# Patient Record
Sex: FEMALE | Race: WHITE | NOT HISPANIC OR LATINO | Employment: UNEMPLOYED | URBAN - METROPOLITAN AREA
[De-identification: names, ages, dates, MRNs, and addresses within clinical notes are randomized per-mention and may not be internally consistent; named-entity substitution may affect disease eponyms.]

---

## 2017-05-05 ENCOUNTER — OFFICE VISIT (OUTPATIENT)
Dept: NEUROLOGY | Facility: CLINIC | Age: 57
End: 2017-05-05

## 2017-05-05 VITALS — DIASTOLIC BLOOD PRESSURE: 94 MMHG | HEART RATE: 106 BPM | SYSTOLIC BLOOD PRESSURE: 140 MMHG | RESPIRATION RATE: 18 BRPM

## 2017-05-05 DIAGNOSIS — R15.9 FECAL INCONTINENCE: ICD-10-CM

## 2017-05-05 DIAGNOSIS — M54.2 CERVICALGIA: ICD-10-CM

## 2017-05-05 DIAGNOSIS — R32 URINARY INCONTINENCE, UNSPECIFIED TYPE: ICD-10-CM

## 2017-05-05 DIAGNOSIS — I15.9 SECONDARY HYPERTENSION: ICD-10-CM

## 2017-05-05 RX ORDER — ALBUTEROL SULFATE 90 UG/1
2 AEROSOL, METERED RESPIRATORY (INHALATION) EVERY 4 HOURS PRN
COMMUNITY
Start: 2011-11-07

## 2017-05-05 RX ORDER — DIAZEPAM 5 MG
TABLET ORAL
Qty: 2 TABLET | Refills: 0 | Status: SHIPPED | OUTPATIENT
Start: 2017-05-05 | End: 2022-06-29

## 2017-05-05 RX ORDER — EPINEPHRINE 0.3 MG/.3ML
0.3 INJECTION SUBCUTANEOUS PRN
COMMUNITY
Start: 2011-07-08

## 2017-05-05 RX ORDER — MULTIVIT-MIN/IRON FUM/FOLIC AC 7.5 MG-4
1 TABLET ORAL DAILY
COMMUNITY
Start: 2011-11-01 | End: 2022-06-29

## 2017-05-05 RX ORDER — LEVOTHYROXINE SODIUM 200 UG/1
200 TABLET ORAL
COMMUNITY
Start: 2017-03-16 | End: 2022-06-29

## 2017-05-05 RX ORDER — CLONIDINE HYDROCHLORIDE 0.1 MG/1
0.1 TABLET ORAL 2 TIMES DAILY PRN
COMMUNITY
Start: 2017-03-16 | End: 2022-06-29

## 2017-05-05 ASSESSMENT — PAIN SCALES - GENERAL: PAINLEVEL: EXTREME PAIN (8)

## 2017-05-05 NOTE — LETTER
2017       RE: Jen Perry  [address]  [facility] [place] [facility] [address]     Dear Colleague,    Thank you for referring your patient, Jen Perry, to the [facility] NEUROLOGY CLINIC at [facility]. Please see a copy of my visit note below.    May 5, 2017      Tati Garcia MD   [facility]    [address] [place] [address]      RE: Jen Perry   MRN: [id number]   : 1960      Dear Tati:      I saw Jen Perry on 2017.  She is a 56-year-old female here for evaluation of somewhat unusual spells that have been occurring daily for the last couple of months.      The episodes are rather stereotyped.  She will get a tightening pain in her neck that may spread to both sides of her head and then into her face and chest.  She has a sense like she will need to vomit and move her bowels.  She can be incontinent of urine.  The episodes typically last several minutes.  She tells me she is awake and remembers what is happening, but she has some trouble with her concentration during these episodes.  On occasion she has stuttered and had difficulty speaking.  She can have up to 3 of these a day.  They sometimes awaken her from sleep.  She does not describe any flushing and her sister and mother who were present have not noticed her to be flushed during these episodes.      She does report she had a flurry of seizures 8 years ago that persisted over about 3 months.  She tells me this was related to withdrawing from benzodiazepines.  She also gives a history of migraine in the past but they are rather infrequent now.      She does tell me she went to the emergency room and they did some evaluation from a cardiac standpoint, but I am not certain how extensive that was and I do not have any specific record of that.      She did have a brain MRI scan done on 2017.  There were no  acute intracranial lesions.  There were a few small foci of nonspecific T2 FLAIR signal change in the cerebral white matter and selina consistent with mild chronic small vessel disease.      A head MR angiogram was normal.      An intracranial MR angiogram demonstrated a couple of small outpouchings arising from vessels.  One being the left posterior cerebral artery and the second being the left anterior cerebral artery.  She subsequently had a formal angiogram.  The reading I have indicates it was felt these were most likely infundibulum but she does tell me that she has seen Dr. Gonzalez who thinks they may represent aneurysms and he is going to be following them.  I did advise her that these were incidental findings.      She does have a chronic pain syndrome and had been on methadone but came off it 3 weeks ago and she wonders if coming off the methadone may have precipitated these spells.      Her past medical history is notable for depression, chronic pain, substance abuse, asthma and hypothyroidism.  She has had a cervical fusion, she believes at C3-C4 and C4-C5.      She indicates that she has recently been diagnosed with hypertension.      CURRENT MEDICATIONS:     1.  Albuterol.     2.  Clonidine.     3.  Levothyroxine.     4.  Multivitamin.      ALLERGIES:  She has allergies to shellfish, quetiapine (myoclonus), sumatriptan, cyclobenzaprine, latex, penicillin, prochlorperazine, and neomycin, bacitracin, polymyxin.      There is no family history of neurologic disease that she is aware of.      The patient is disabled.  She sometimes smokes.  She has up to 3 alcoholic drinks a day.      Examination reveals patient is alert and cooperative.  Heart rate 106.  Blood pressure 140/94.  Pupils are equal, round and react well to light.  Extraocular motility is full.  Visual fields are intact.  Fundi are unremarkable.  Cranial nerves II-XII are intact.  Motor, sensory and cerebellar testing are normal.  She does have  a slight postural tremor.  Her gait is normal.  She is generally hyporeflexic.  Plantar responses are flexor.      IMPRESSION:  Unusual spells.        The patient reports the recent development of daily episodes of neck pain, head pain, chest pain, followed by diarrhea, urinary incontinence, and mild confusion.  The etiology of these spells at this juncture is unclear.      PLAN:  I did discuss the possibility of atypical angina with her.  As per our discussion, she is going to be contacting your office to set up a cardiac stress test.      I think seizure is a low probability, but is in the differential and she is going to have a 3-hour EEG done.      It does seem that these episodes initially start with pain in the neck.  She has had previous cervical spinal surgery.  I have asked her to have a cervical MRI scan done.      In view of these paroxysmal episodes I am also going to have her do a 24-hour urine collection for catecholamines, metanephrines and 5-HIAA.      I will be seeing her back to review test results.      Sincerely,        Andres Vyas MD              D: 2017 12:43   T: 2017 14:51   MT: AKA      Name:     ZENON ISAAC   MRN:      [id number]        Account:      [id number]   :      1960           Service Date: 2017      Document: [id number]

## 2017-05-05 NOTE — PROGRESS NOTES
May 5, 2017      Tati Garcia MD   [facility]    [address] [place] [address]    [place] [address]      RE: Jen Perry   MRN: [id number]   : 1960      Dear Tati:      I saw Jen Perry on 2017.  She is a 56-year-old female here for evaluation of somewhat unusual spells that have been occurring daily for the last couple of months.      The episodes are rather stereotyped.  She will get a tightening pain in her neck that may spread to both sides of her head and then into her face and chest.  She has a sense like she will need to vomit and move her bowels.  She can be incontinent of urine.  The episodes typically last several minutes.  She tells me she is awake and remembers what is happening, but she has some trouble with her concentration during these episodes.  On occasion she has stuttered and had difficulty speaking.  She can have up to 3 of these a day.  They sometimes awaken her from sleep.  She does not describe any flushing and her sister and mother who were present have not noticed her to be flushed during these episodes.      She does report she had a flurry of seizures 8 years ago that persisted over about 3 months.  She tells me this was related to withdrawing from benzodiazepines.  She also gives a history of migraine in the past but they are rather infrequent now.      She does tell me she went to the emergency room and they did some evaluation from a cardiac standpoint, but I am not certain how extensive that was and I do not have any specific record of that.      She did have a brain MRI scan done on 2017.  There were no acute intracranial lesions.  There were a few small foci of nonspecific T2 FLAIR signal change in the cerebral white matter and selina consistent with mild chronic small vessel disease.      A head MR angiogram was normal.      An intracranial MR angiogram demonstrated a couple of small outpouchings arising from vessels.  One being  the left posterior cerebral artery and the second being the left anterior cerebral artery.  She subsequently had a formal angiogram.  The reading I have indicates it was felt these were most likely infundibulum but she does tell me that she has seen Dr. Gonzalez who thinks they may represent aneurysms and he is going to be following them.  I did advise her that these were incidental findings.      She does have a chronic pain syndrome and had been on methadone but came off it 3 weeks ago and she wonders if coming off the methadone may have precipitated these spells.      Her past medical history is notable for depression, chronic pain, substance abuse, asthma and hypothyroidism.  She has had a cervical fusion, she believes at C3-C4 and C4-C5.      She indicates that she has recently been diagnosed with hypertension.      CURRENT MEDICATIONS:     1.  Albuterol.     2.  Clonidine.     3.  Levothyroxine.     4.  Multivitamin.      ALLERGIES:  She has allergies to shellfish, quetiapine (myoclonus), sumatriptan, cyclobenzaprine, latex, penicillin, prochlorperazine, and neomycin, bacitracin, polymyxin.      There is no family history of neurologic disease that she is aware of.      The patient is disabled.  She sometimes smokes.  She has up to 3 alcoholic drinks a day.      Examination reveals patient is alert and cooperative.  Heart rate 106.  Blood pressure 140/94.  Pupils are equal, round and react well to light.  Extraocular motility is full.  Visual fields are intact.  Fundi are unremarkable.  Cranial nerves II-XII are intact.  Motor, sensory and cerebellar testing are normal.  She does have a slight postural tremor.  Her gait is normal.  She is generally hyporeflexic.  Plantar responses are flexor.      IMPRESSION:  Unusual spells.        The patient reports the recent development of daily episodes of neck pain, head pain, chest pain, followed by diarrhea, urinary incontinence, and mild confusion.  The etiology of  these spells at this juncture is unclear.      PLAN:  I did discuss the possibility of atypical angina with her.  As per our discussion, she is going to be contacting your office to set up a cardiac stress test.      I think seizure is a low probability, but is in the differential and she is going to have a 3-hour EEG done.      It does seem that these episodes initially start with pain in the neck.  She has had previous cervical spinal surgery.  I have asked her to have a cervical MRI scan done.      In view of these paroxysmal episodes I am also going to have her do a 24-hour urine collection for catecholamines, metanephrines and 5-HIAA.      I will be seeing her back to review test results.      Sincerely,        MD LETICIA Patton MD             D: 2017 12:43   T: 2017 14:51   MT: AKA      Name:     ZENON ISAAC   MRN:      [id number]        Account:      [id number]   :      1960           Service Date: 2017      Document: [id number]

## 2017-05-05 NOTE — NURSING NOTE
Chief Complaint   Patient presents with     Consult     hx of aneurysms, seizures x30 in six weeks     Siva Norris, LPN

## 2017-05-05 NOTE — MR AVS SNAPSHOT
After Visit Summary   5/5/2017    Jen Perry    MRN: [id number]           Patient Information     Date Of Birth          1960        Visit Information        Provider Department      5/5/2017 8:00 AM Andres Vyas MD [facility]        Today's Diagnoses     Paroxysmal spells (H)    -  1    Cervicalgia        Urinary incontinence, unspecified type        Fecal incontinence        Secondary hypertension          Care Instructions    Contact DR Garcia's office to set up Cardiac Stress Test. [phone]          Follow-ups after your visit        Follow-up notes from your care team     Inform patient at next visit Return in about 2 weeks (around 5/19/2017).      Future tests that were ordered for you today     Open Future Orders        Priority Expected Expires Ordered    URINE CATECHOLAMINES Routine 5/9/2017 5/5/2018 5/5/2017    Metanephrine random or 24 hr urine Routine 5/9/2017 5/5/2018 5/5/2017    5-HIAA quantitative urine Routine 5/9/2017 6/4/2017 5/5/2017    ORDER:  EEG Routine 5/15/2017 5/5/2018 5/5/2017    MR Cervical Spine w/o Contrast Routine 5/9/2017 5/5/2018 5/5/2017            Who to contact     Please call your clinic at [phone] to:    Ask questions about your health    Make or cancel appointments    Discuss your medicines    Learn about your test results    Speak to your doctor   If you have compliments or concerns about an experience at your clinic, or if you wish to file a complaint, please contact [facility] Patient Relations at [phone] or email us at [email or web address]         Additional Information About Your Visit        MyChart Information     [employer] is an electronic gateway that provides easy, online access to your medical records. With [employer], you can request a clinic appointment, read your test results, renew a prescription or communicate with your care team.      To sign up for [employer] visit the website at [email or web address]   You will be asked to enter the access code listed below, as well as some personal information. Please follow the directions to create your username and password.     Your access code is: [email or web address]  Expires: 8/3/2017  8:41 AM     Your access code will  in 90 days. If you need help or a new code, please contact your [facility] or call [phone] for assistance.        Care EveryWhere ID     This is your Care EveryWhere ID. This could be used by other organizations to access your [facility] medical records  [id number]        Your Vitals Were     Pulse Respirations                106 18           Blood Pressure from Last 3 Encounters:   17 (!) 140/94    Weight from Last 3 Encounters:   No data found for Wt                 Today's Medication Changes          These changes are accurate as of: 17  8:41 AM.  If you have any questions, ask your nurse or doctor.               Start taking these medicines.        Dose/Directions    diazepam 5 MG tablet   Commonly known as:  VALIUM   Used for:  Paroxysmal spells (H), Cervicalgia, Urinary incontinence, unspecified type, Fecal incontinence, Secondary hypertension   Started by:  Andres Vyas MD        One tablet one half hour before MRI. May take second tablet if needed   Quantity:  2 tablet   Refills:  0            Where to get your medicines      Some of these will need a paper prescription and others can be bought over the counter.  Ask your nurse if you have questions.     Bring a paper prescription for each of these medications     diazepam 5 MG tablet                Primary Care Provider Office Phone # Fax #    Tati Garcia [phone]       [facility] [address] [place] [address]        Thank you!     Thank you for choosing [facility] NEUROLOGY CLINIC  for your care.  Our goal is always to provide you with excellent care. Hearing back from our patients is one way we can continue to improve our services. Please take a few minutes to complete the written survey that you may receive in the mail after your visit with us. Thank you!             Your Updated Medication List - Protect others around you: Learn how to safely use, store and throw away your medicines at www.disposemymeds.org.          This list is accurate as of: 5/5/17  8:41 AM.  Always use your most recent med list.                   Brand Name Dispense Instructions for use    albuterol 108 (90 BASE) MCG/ACT Inhaler    PROAIR HFA/PROVENTIL HFA/VENTOLIN HFA     Inhale 2 puffs into the lungs every 4 hours as needed       cloNIDine 0.1 MG tablet    CATAPRES     Take 0.1 mg by mouth 2 times daily as needed       diazepam 5 MG tablet    VALIUM    2 tablet    One tablet one half hour before MRI. May take second tablet if needed       EPINEPHrine 0.3 MG/0.3ML injection      Inject 0.3 mg into the muscle as needed       levothyroxine 200 MCG tablet    SYNTHROID/LEVOTHROID     Take 200 mcg by mouth every morning (before breakfast)       MULTI-VITAMIN/MINERALS Tabs      Take 1 tablet by mouth daily

## 2017-05-30 ENCOUNTER — AMBULATORY - RIVER FALLS (OUTPATIENT)
Dept: FAMILY MEDICINE | Facility: CLINIC | Age: 57
End: 2017-05-30

## 2017-09-14 ENCOUNTER — COMMUNICATION - HEALTHEAST (OUTPATIENT)
Dept: FAMILY MEDICINE | Facility: CLINIC | Age: 57
End: 2017-09-14

## 2017-09-14 DIAGNOSIS — E78.5 HYPERLIPIDEMIA: ICD-10-CM

## 2017-09-14 DIAGNOSIS — E55.9 VITAMIN D DEFICIENCY DISEASE: ICD-10-CM

## 2017-09-22 ENCOUNTER — CARE COORDINATION (OUTPATIENT)
Dept: NEUROLOGY | Facility: CLINIC | Age: 57
End: 2017-09-22

## 2017-09-22 NOTE — PROGRESS NOTES
"Andres Vyas MD McAllister, Angela, RN        Phone Number: [phone]                     Yes have her come back. Looks like she never had EEG done and suspect never had 24 hour urine collection. This seems like a new problem however and so should see her before embarking on further testing. SINDY Vyas            Previous Messages       ----- Message -----      From: Britt Mendez RN      Sent: 9/22/2017   8:46 AM        To: Andres Vyas MD   Subject: FW: question                                     Patient calling with new symptom of intermittent \"flashing lights\". States that it only happens when she sleeps and it wakes her up. Describes it as \"lightening\". You saw her once in May. She said she did have imaging at [facility] which I will try to track down. Doesn't remember if she had EEG or labs. I am going to have her come back to see you. Okay? Anything else?     Tom,   Ernestine   ----- Message -----      From: Britt Lucas RN      Sent: 9/21/2017   4:34 PM        To: Britt Mendez RN, Jolanta Duffy LPN   Subject: RE: question                                     This one is for general.   ----- Message -----      From: Jolanta Duffy LPN      Sent: 9/21/2017   4:20 PM        To: Britt Lucas RN   Subject: question                                         Caller name: patient Left a voice mail [phone]     Treating provider/specialty: Asael     Nurse:     Best time to return call:     Message left?     Description of issue/question:            9/22/17: instructed patient that she should come in for an appointment. Also told her to bring her imaging with to appointment. Patient verbalized understanding and agreement with plan. Message sent to clinic coordinators to help arrange appointment.   "

## 2017-10-02 ENCOUNTER — TELEPHONE (OUTPATIENT)
Dept: NEUROLOGY | Facility: CLINIC | Age: 57
End: 2017-10-02

## 2018-04-17 DIAGNOSIS — R15.9 FECAL INCONTINENCE: ICD-10-CM

## 2018-04-17 DIAGNOSIS — I15.9 SECONDARY HYPERTENSION: ICD-10-CM

## 2018-04-17 DIAGNOSIS — M54.2 CERVICALGIA: ICD-10-CM

## 2018-04-17 DIAGNOSIS — R32 URINARY INCONTINENCE, UNSPECIFIED TYPE: ICD-10-CM

## 2018-04-18 RX ORDER — DIAZEPAM 5 MG
TABLET ORAL
Qty: 2 TABLET | Refills: 0 | OUTPATIENT
Start: 2018-04-18

## 2021-06-02 ENCOUNTER — RECORDS - HEALTHEAST (OUTPATIENT)
Dept: ADMINISTRATIVE | Facility: CLINIC | Age: 61
End: 2021-06-02

## 2022-06-29 ENCOUNTER — HOSPITAL ENCOUNTER (EMERGENCY)
Facility: CLINIC | Age: 62
Discharge: HOME | End: 2022-06-29
Attending: FAMILY MEDICINE | Admitting: FAMILY MEDICINE
Payer: COMMERCIAL

## 2022-06-29 VITALS
HEIGHT: 62 IN | BODY MASS INDEX: 24.93 KG/M2 | OXYGEN SATURATION: 100 % | SYSTOLIC BLOOD PRESSURE: 137 MMHG | WEIGHT: 135.5 LBS | TEMPERATURE: 98.7 F | HEART RATE: 98 BPM | RESPIRATION RATE: 16 BRPM | DIASTOLIC BLOOD PRESSURE: 82 MMHG

## 2022-06-29 DIAGNOSIS — F11.23 OPIOID DEPENDENCE WITH WITHDRAWAL (H): ICD-10-CM

## 2022-06-29 LAB
ALBUMIN SERPL-MCNC: 4.3 G/DL (ref 3.4–5)
ALP SERPL-CCNC: 108 U/L (ref 40–150)
ALT SERPL W P-5'-P-CCNC: 23 U/L (ref 0–50)
ANION GAP SERPL CALCULATED.3IONS-SCNC: 6 MMOL/L (ref 3–14)
AST SERPL W P-5'-P-CCNC: 18 U/L (ref 0–45)
BASOPHILS # BLD AUTO: 0 10E3/UL (ref 0–0.2)
BASOPHILS NFR BLD AUTO: 1 %
BILIRUB SERPL-MCNC: 0.6 MG/DL (ref 0.2–1.3)
BUN SERPL-MCNC: 8 MG/DL (ref 7–30)
CALCIUM SERPL-MCNC: 9.7 MG/DL (ref 8.5–10.1)
CHLORIDE BLD-SCNC: 111 MMOL/L (ref 94–109)
CO2 SERPL-SCNC: 23 MMOL/L (ref 20–32)
CREAT SERPL-MCNC: 0.69 MG/DL (ref 0.52–1.04)
EOSINOPHIL # BLD AUTO: 0.1 10E3/UL (ref 0–0.7)
EOSINOPHIL NFR BLD AUTO: 2 %
ERYTHROCYTE [DISTWIDTH] IN BLOOD BY AUTOMATED COUNT: 14 % (ref 10–15)
ETHANOL SERPL-MCNC: <0.01 G/DL
GFR SERPL CREATININE-BSD FRML MDRD: >90 ML/MIN/1.73M2
GLUCOSE BLD-MCNC: 89 MG/DL (ref 70–99)
HCT VFR BLD AUTO: 34.8 % (ref 35–47)
HGB BLD-MCNC: 11.5 G/DL (ref 11.7–15.7)
IMM GRANULOCYTES # BLD: 0 10E3/UL
IMM GRANULOCYTES NFR BLD: 0 %
LIPASE SERPL-CCNC: 70 U/L (ref 73–393)
LYMPHOCYTES # BLD AUTO: 1.8 10E3/UL (ref 0.8–5.3)
LYMPHOCYTES NFR BLD AUTO: 28 %
MCH RBC QN AUTO: 30.2 PG (ref 26.5–33)
MCHC RBC AUTO-ENTMCNC: 33 G/DL (ref 31.5–36.5)
MCV RBC AUTO: 91 FL (ref 78–100)
MONOCYTES # BLD AUTO: 0.4 10E3/UL (ref 0–1.3)
MONOCYTES NFR BLD AUTO: 6 %
NEUTROPHILS # BLD AUTO: 4 10E3/UL (ref 1.6–8.3)
NEUTROPHILS NFR BLD AUTO: 63 %
NRBC # BLD AUTO: 0 10E3/UL
NRBC BLD AUTO-RTO: 0 /100
PLATELET # BLD AUTO: 323 10E3/UL (ref 150–450)
POTASSIUM BLD-SCNC: 4.1 MMOL/L (ref 3.4–5.3)
PROT SERPL-MCNC: 7.6 G/DL (ref 6.8–8.8)
RBC # BLD AUTO: 3.81 10E6/UL (ref 3.8–5.2)
SODIUM SERPL-SCNC: 140 MMOL/L (ref 133–144)
WBC # BLD AUTO: 6.3 10E3/UL (ref 4–11)

## 2022-06-29 PROCEDURE — 36415 COLL VENOUS BLD VENIPUNCTURE: CPT | Performed by: FAMILY MEDICINE

## 2022-06-29 PROCEDURE — 99284 EMERGENCY DEPT VISIT MOD MDM: CPT | Performed by: FAMILY MEDICINE

## 2022-06-29 PROCEDURE — 250N000013 HC RX MED GY IP 250 OP 250 PS 637: Performed by: FAMILY MEDICINE

## 2022-06-29 PROCEDURE — 99283 EMERGENCY DEPT VISIT LOW MDM: CPT | Performed by: FAMILY MEDICINE

## 2022-06-29 PROCEDURE — 82077 ASSAY SPEC XCP UR&BREATH IA: CPT | Performed by: FAMILY MEDICINE

## 2022-06-29 PROCEDURE — 80053 COMPREHEN METABOLIC PANEL: CPT | Performed by: FAMILY MEDICINE

## 2022-06-29 PROCEDURE — 85014 HEMATOCRIT: CPT | Performed by: FAMILY MEDICINE

## 2022-06-29 PROCEDURE — 83690 ASSAY OF LIPASE: CPT | Performed by: FAMILY MEDICINE

## 2022-06-29 RX ORDER — PRAZOSIN HYDROCHLORIDE 2 MG/1
2 CAPSULE ORAL
COMMUNITY
Start: 2022-06-20

## 2022-06-29 RX ORDER — BUPRENORPHINE AND NALOXONE 8; 2 MG/1; MG/1
1 FILM, SOLUBLE BUCCAL; SUBLINGUAL ONCE
Status: COMPLETED | OUTPATIENT
Start: 2022-06-29 | End: 2022-06-29

## 2022-06-29 RX ORDER — ESCITALOPRAM OXALATE 10 MG/1
15 TABLET ORAL
COMMUNITY
Start: 2022-06-20

## 2022-06-29 RX ORDER — IBUPROFEN 200 MG
600 TABLET ORAL
COMMUNITY

## 2022-06-29 RX ORDER — PROPRANOLOL HYDROCHLORIDE 10 MG/1
TABLET ORAL EVERY MORNING
COMMUNITY
Start: 2021-10-27

## 2022-06-29 RX ORDER — FLUTICASONE PROPIONATE AND SALMETEROL 250; 50 UG/1; UG/1
1 POWDER RESPIRATORY (INHALATION)
COMMUNITY
Start: 2022-06-20

## 2022-06-29 RX ORDER — OXYCODONE AND ACETAMINOPHEN 10; 325 MG/1; MG/1
2 TABLET ORAL
COMMUNITY

## 2022-06-29 RX ORDER — LEVOTHYROXINE SODIUM 175 UG/1
TABLET ORAL
COMMUNITY
Start: 2022-06-14

## 2022-06-29 RX ORDER — HYDROXYZINE PAMOATE 50 MG/1
50 CAPSULE ORAL
COMMUNITY
Start: 2021-09-29

## 2022-06-29 RX ORDER — MULTIPLE VITAMINS W/ MINERALS TAB 9MG-400MCG
1 TAB ORAL DAILY
COMMUNITY

## 2022-06-29 RX ORDER — BUPRENORPHINE AND NALOXONE 2; .5 MG/1; MG/1
1 FILM, SOLUBLE BUCCAL; SUBLINGUAL ONCE
Status: COMPLETED | OUTPATIENT
Start: 2022-06-29 | End: 2022-06-29

## 2022-06-29 RX ORDER — BUPRENORPHINE AND NALOXONE 8; 2 MG/1; MG/1
1 FILM, SOLUBLE BUCCAL; SUBLINGUAL 2 TIMES DAILY
Qty: 7 FILM | Refills: 0 | Status: SHIPPED | OUTPATIENT
Start: 2022-06-29

## 2022-06-29 RX ORDER — GABAPENTIN 600 MG/1
600 TABLET ORAL
COMMUNITY

## 2022-06-29 RX ORDER — MORPHINE SULFATE 15 MG/1
15 TABLET, FILM COATED, EXTENDED RELEASE ORAL
COMMUNITY
Start: 2021-06-09

## 2022-06-29 RX ORDER — MIRTAZAPINE 7.5 MG/1
7.5 TABLET, FILM COATED ORAL
COMMUNITY
Start: 2022-06-20

## 2022-06-29 RX ADMIN — BUPRENORPHINE AND NALOXONE 1 FILM: 8; 2 FILM BUCCAL; SUBLINGUAL at 17:22

## 2022-06-29 RX ADMIN — BUPRENORPHINE AND NALOXONE 1 FILM: 8; 2 FILM BUCCAL; SUBLINGUAL at 21:24

## 2022-06-29 RX ADMIN — NICOTINE POLACRILEX 2 MG: 2 GUM, CHEWING ORAL at 20:59

## 2022-06-29 RX ADMIN — BUPRENORPHINE AND NALOXONE 1 FILM: 2; .5 FILM, SOLUBLE BUCCAL; SUBLINGUAL at 16:44

## 2022-06-29 NOTE — ED TRIAGE NOTES
Patient presents seeking detox from percocet/morphine; last used 5 days ago. Patient would like to get into long term treatment after medical detox.      Triage Assessment     Row Name 06/29/22 0984       Triage Assessment (Adult)    Airway WDL WDL       Respiratory WDL    Respiratory WDL WDL       Skin Circulation/Temperature WDL    Skin Circulation/Temperature WDL WDL       Cardiac WDL    Cardiac WDL WDL       Peripheral/Neurovascular WDL    Peripheral Neurovascular WDL WDL       Cognitive/Neuro/Behavioral WDL    Cognitive/Neuro/Behavioral WDL WDL

## 2022-06-29 NOTE — ED NOTES
[employer] completed.  Safety screen complete.  Belongings: purple suitcase, black purse, green reusable bag. Writer asked patient if they had any valuables they wanted to report having with. Patient stated they did not have any valuables with.

## 2022-06-30 ENCOUNTER — TELEPHONE (OUTPATIENT)
Dept: BEHAVIORAL HEALTH | Facility: CLINIC | Age: 62
End: 2022-06-30

## 2022-06-30 DIAGNOSIS — Z71.89 OTHER SPECIFIED COUNSELING: ICD-10-CM

## 2022-07-01 ENCOUNTER — PATIENT OUTREACH (OUTPATIENT)
Dept: CARE COORDINATION | Facility: CLINIC | Age: 62
End: 2022-07-01

## 2022-07-01 NOTE — PROGRESS NOTES
Clinic Care Coordination Contact  [facility]/Voicemail       Clinical Data: Care Coordinator Outreach  Outreach attempted x 1.  Left message on patient's voicemail with call back information and requested return call.  Plan: Care Coordinator will try to reach patient again in 1-2 business days.    ORVILLE Zaldivar  Connected Care Resource Center  [facility]     *Connected Care Resource Team does NOT follow patient ongoing. Referrals are identified based on internal discharge reports and the outreach is to ensure patient has an understanding of their discharge instructions.

## 2022-07-01 NOTE — ED PROVIDER NOTES
ED Provider Note  [facility]      History     Chief Complaint   Patient presents with     Addiction Problem     Patient presents seeking detox from percocet/morphine; last used 5 days ago. Patient would like to get into long term treatment after medical detox.      QUINN Ruanoramin Pacheco is a 62 year old female who treatment for her chronic Percocet morphine use.  Patient states that she has had chronic pain issues and was being treated with both Percocet and morphine she denies any overuse but states that this has been a chronic situation and she wants to stop using narcotics if possible.  Patient is interested in both detox and chemical dependency treatment she understands that there is now no inpatient detox for opiates.  Patient has not had Suboxone in the past she notes that she has stopped taking her opiates and has been off of them for at least 4 days she is having withdrawal symptoms but states that she has been able to manage them for the most part but is interested in starting Suboxone.    Past Medical History  History reviewed. No pertinent past medical history.  Past Surgical History:   Procedure Laterality Date     [facility] REDUCTION OF LARGE BREAST      Description: Breast Surgery Reduction Procedure;  Recorded: 02/26/2014;     [facility] REMOVAL OF OVARY(S)      Description: Oophorectomy Unilateral Right Side;  Recorded: 02/26/2014;     [facility] TOTAL ABDOM HYSTERECTOMY      Description: Total Abdominal Hysterectomy;  Recorded: 02/26/2014;     albuterol (PROAIR HFA/PROVENTIL HFA/VENTOLIN HFA) 108 (90 BASE) MCG/ACT Inhaler  buprenorphine HCl-naloxone HCl (SUBOXONE) 8-2 MG per film  cholecalciferol 50 MCG (2000 UT) tablet  escitalopram (LEXAPRO) 10 MG tablet  fluticasone-salmeterol (ADVAIR) 250-50 MCG/ACT inhaler  gabapentin (NEURONTIN) 600 MG tablet  hydrOXYzine (VISTARIL) 50 MG capsule  ibuprofen (ADVIL/MOTRIN) 200 MG tablet  levothyroxine (SYNTHROID/LEVOTHROID) 175 MCG  "tablet  mirtazapine (REMERON) 7.5 MG tablet  morphine (MS CONTIN) 15 MG CR tablet  multivitamin w/minerals (THERA-VIT-M) tablet  oxyCODONE-acetaminophen (PERCOCET)  MG per tablet  prazosin (MINIPRESS) 2 MG capsule  propranolol (INDERAL) 10 MG tablet  tiZANidine (ZANAFLEX) 4 MG tablet  EPINEPHrine 0.3 MG/0.3ML injection      Allergies   Allergen Reactions     Penicillins Swelling and Rash     Shellfish-Derived Products Anaphylaxis     Diatrizoate Unknown     Not given because of allergy to shellfish     Rxgiz-Owvuo-Wpbpvlc-Pramoxine      Rash     Quetiapine      Myoclonus.      Sumatriptan Other (See Comments)     Unable to take any triptans     Cyclobenzaprine Swelling and Rash     Latex Rash     Prochlorperazine Anxiety     Family History  Family History   Problem Relation Age of Onset     Hypertension Mother      No Known Problems Father      Heart Disease Paternal Grandmother      Heart Disease Paternal Grandfather      Social History   Social History     Tobacco Use     Smoking status: Current Every Day Smoker     Packs/day: 0.25     Types: Cigarettes     Smokeless tobacco: Never Used   Substance Use Topics     Alcohol use: Not Currently     Alcohol/week: 21.0 standard drinks     Types: 21 Shots of liquor per week     Drug use: Yes     Types: Morphine, Opiates      Past medical history, past surgical history, medications, allergies, family history, and social history were reviewed with the patient. No additional pertinent items.       Review of Systems  A complete review of systems was performed with pertinent positives and negatives noted in the HPI, and all other systems negative.    Physical Exam   BP: 118/63  Pulse: 109  Temp: 98.7  F (37.1  C)  Resp: 18  Height: 157.5 cm (5' 2\")  Weight: 61.5 kg (135 lb 8 oz)  SpO2: 98 %  Physical Exam  Constitutional:       General: She is not in acute distress.     Appearance: She is not diaphoretic.   HENT:      Head: Atraumatic.      Mouth/Throat:      Pharynx: No " oropharyngeal exudate.   Eyes:      General: No scleral icterus.     Pupils: Pupils are equal, round, and reactive to light.   Cardiovascular:      Heart sounds: Normal heart sounds.   Pulmonary:      Effort: No respiratory distress.      Breath sounds: Normal breath sounds.   Abdominal:      General: Bowel sounds are normal.      Palpations: Abdomen is soft.      Tenderness: There is no abdominal tenderness.   Musculoskeletal:         General: No tenderness.   Skin:     General: Skin is warm.      Findings: No rash.   Neurological:      General: No focal deficit present.      Mental Status: She is oriented to person, place, and time.      Sensory: No sensory deficit.      Motor: No weakness.      Coordination: Coordination normal.         ED Course      Procedures       The medical record was reviewed and interpreted.  Current labs reviewed and interpreted.              Results for orders placed or performed during the hospital encounter of 06/29/22   Comprehensive metabolic panel     Status: Abnormal   Result Value Ref Range    Sodium 140 133 - 144 mmol/L    Potassium 4.1 3.4 - 5.3 mmol/L    Chloride 111 (H) 94 - 109 mmol/L    Carbon Dioxide (CO2) 23 20 - 32 mmol/L    Anion Gap 6 3 - 14 mmol/L    Urea Nitrogen 8 7 - 30 mg/dL    Creatinine 0.69 0.52 - 1.04 mg/dL    Calcium 9.7 8.5 - 10.1 mg/dL    Glucose 89 70 - 99 mg/dL    Alkaline Phosphatase 108 40 - 150 U/L    AST 18 0 - 45 U/L    ALT 23 0 - 50 U/L    Protein Total 7.6 6.8 - 8.8 g/dL    Albumin 4.3 3.4 - 5.0 g/dL    Bilirubin Total 0.6 0.2 - 1.3 mg/dL    GFR Estimate >90 >60 mL/min/1.73m2   Lipase     Status: Abnormal   Result Value Ref Range    Lipase 70 (L) 73 - 393 U/L   Ethyl Alcohol Level     Status: Normal   Result Value Ref Range    Alcohol ethyl <0.01 <=0.01 g/dL   CBC with platelets and differential     Status: Abnormal   Result Value Ref Range    WBC Count 6.3 4.0 - 11.0 10e3/uL    RBC Count 3.81 3.80 - 5.20 10e6/uL    Hemoglobin 11.5 (L) 11.7 - 15.7  g/dL    Hematocrit 34.8 (L) 35.0 - 47.0 %    MCV 91 78 - 100 fL    MCH 30.2 26.5 - 33.0 pg    MCHC 33.0 31.5 - 36.5 g/dL    RDW 14.0 10.0 - 15.0 %    Platelet Count 323 150 - 450 10e3/uL    % Neutrophils 63 %    % Lymphocytes 28 %    % Monocytes 6 %    % Eosinophils 2 %    % Basophils 1 %    % Immature Granulocytes 0 %    NRBCs per 100 WBC 0 <1 /100    Absolute Neutrophils 4.0 1.6 - 8.3 10e3/uL    Absolute Lymphocytes 1.8 0.8 - 5.3 10e3/uL    Absolute Monocytes 0.4 0.0 - 1.3 10e3/uL    Absolute Eosinophils 0.1 0.0 - 0.7 10e3/uL    Absolute Basophils 0.0 0.0 - 0.2 10e3/uL    Absolute Immature Granulocytes 0.0 <=0.4 10e3/uL    Absolute NRBCs 0.0 10e3/uL   CBC with platelets differential     Status: Abnormal    Narrative    The following orders were created for panel order CBC with platelets differential.  Procedure                               Abnormality         Status                     ---------                               -----------         ------                     CBC with platelets and d...[[id number]]  Abnormal            Final result                 Please view results for these tests on the individual orders.     Medications   buprenorphine HCl-naloxone HCl (SUBOXONE) 2-0.5 MG per film 1 Film (1 Film Sublingual Given 6/29/22 1644)   buprenorphine HCl-naloxone HCl (SUBOXONE) 8-2 MG per film 1 Film (1 Film Sublingual Given 6/29/22 1722)   nicotine (NICORETTE) gum 2 mg (2 mg Buccal Given 6/29/22 2059)   buprenorphine HCl-naloxone HCl (SUBOXONE) 8-2 MG per film 1 Film (1 Film Sublingual Given 6/29/22 2124)        Assessments & Plan (with Medical Decision Making)       I have reviewed the nursing notes. I have reviewed the findings, diagnosis, plan and need for follow up with the patient.    Discharge Medication List as of 6/29/2022  9:47 PM      START taking these medications    Details   buprenorphine HCl-naloxone HCl (SUBOXONE) 8-2 MG per film Place 1 Film under the tongue 2 times daily, Disp-7 Film, R-0,  Local Print           Patient with opiate dependence seeking detox at this time was given an initial test dose of 2 mg tolerating this quite well she was then given 2 further doses of 8 mg while under observation here in the emergency room for several hours.  Patient will  her prescription in the morning and was prescribed 8 mg twice a day for the next 3 days she will then follow-up with the recovery clinic.        Final diagnoses:   Opioid dependence with withdrawal (H)       --    [facility] EMERGENCY DEPARTMENT  6/29/2022     Lalo Hsieh MD  06/30/22 3781       Lalo Hsieh MD  07/09/22 [id number]

## 2022-07-02 NOTE — PROGRESS NOTES
Clinic Care Coordination Contact  [facility]/Voicemail       Clinical Data: Care Coordinator Outreach    Outreach attempted x 2.  Left message on patient's voicemail with call back information and requested return call.    Plan:  Care Coordinator will do no further outreaches at this time.    Alondra Acevedo, [facility]  [phone]  [facility]

## 2022-10-22 ENCOUNTER — HOSPITAL ENCOUNTER (EMERGENCY)
Facility: HOSPITAL | Age: 62
Discharge: HOME | End: 2022-10-23
Attending: EMERGENCY MEDICINE | Admitting: EMERGENCY MEDICINE
Payer: COMMERCIAL

## 2022-10-22 ENCOUNTER — APPOINTMENT (OUTPATIENT)
Dept: CT IMAGING | Facility: HOSPITAL | Age: 62
End: 2022-10-22
Attending: EMERGENCY MEDICINE
Payer: COMMERCIAL

## 2022-10-22 DIAGNOSIS — R51.9 ACUTE NONINTRACTABLE HEADACHE, UNSPECIFIED HEADACHE TYPE: ICD-10-CM

## 2022-10-22 LAB
BASOPHILS # BLD AUTO: 0 10E3/UL (ref 0–0.2)
BASOPHILS NFR BLD AUTO: 1 %
EOSINOPHIL # BLD AUTO: 0.1 10E3/UL (ref 0–0.7)
EOSINOPHIL NFR BLD AUTO: 1 %
ERYTHROCYTE [DISTWIDTH] IN BLOOD BY AUTOMATED COUNT: 13.2 % (ref 10–15)
HCT VFR BLD AUTO: 37.3 % (ref 35–47)
HGB BLD-MCNC: 12.5 G/DL (ref 11.7–15.7)
IMM GRANULOCYTES # BLD: 0 10E3/UL
IMM GRANULOCYTES NFR BLD: 0 %
LIPASE SERPL-CCNC: 63 U/L (ref 13–60)
LYMPHOCYTES # BLD AUTO: 2.2 10E3/UL (ref 0.8–5.3)
LYMPHOCYTES NFR BLD AUTO: 28 %
MCH RBC QN AUTO: 30.3 PG (ref 26.5–33)
MCHC RBC AUTO-ENTMCNC: 33.5 G/DL (ref 31.5–36.5)
MCV RBC AUTO: 90 FL (ref 78–100)
MONOCYTES # BLD AUTO: 0.6 10E3/UL (ref 0–1.3)
MONOCYTES NFR BLD AUTO: 8 %
NEUTROPHILS # BLD AUTO: 4.9 10E3/UL (ref 1.6–8.3)
NEUTROPHILS NFR BLD AUTO: 62 %
NRBC # BLD AUTO: 0 10E3/UL
NRBC BLD AUTO-RTO: 0 /100
PLATELET # BLD AUTO: 311 10E3/UL (ref 150–450)
RBC # BLD AUTO: 4.13 10E6/UL (ref 3.8–5.2)
WBC # BLD AUTO: 7.7 10E3/UL (ref 4–11)

## 2022-10-22 PROCEDURE — 258N000003 HC RX IP 258 OP 636: Performed by: EMERGENCY MEDICINE

## 2022-10-22 PROCEDURE — 250N000011 HC RX IP 250 OP 636: Performed by: EMERGENCY MEDICINE

## 2022-10-22 PROCEDURE — 96361 HYDRATE IV INFUSION ADD-ON: CPT

## 2022-10-22 PROCEDURE — 83690 ASSAY OF LIPASE: CPT | Performed by: EMERGENCY MEDICINE

## 2022-10-22 PROCEDURE — 85025 COMPLETE CBC W/AUTO DIFF WBC: CPT | Performed by: EMERGENCY MEDICINE

## 2022-10-22 PROCEDURE — 99285 EMERGENCY DEPT VISIT HI MDM: CPT | Mod: 25

## 2022-10-22 PROCEDURE — 250N000013 HC RX MED GY IP 250 OP 250 PS 637: Performed by: EMERGENCY MEDICINE

## 2022-10-22 PROCEDURE — 70450 CT HEAD/BRAIN W/O DYE: CPT

## 2022-10-22 PROCEDURE — 96374 THER/PROPH/DIAG INJ IV PUSH: CPT

## 2022-10-22 PROCEDURE — 36415 COLL VENOUS BLD VENIPUNCTURE: CPT | Performed by: EMERGENCY MEDICINE

## 2022-10-22 RX ORDER — KETOROLAC TROMETHAMINE 30 MG/ML
30 INJECTION, SOLUTION INTRAMUSCULAR; INTRAVENOUS ONCE
Status: COMPLETED | OUTPATIENT
Start: 2022-10-22 | End: 2022-10-22

## 2022-10-22 RX ORDER — HYDROXYZINE HYDROCHLORIDE 25 MG/1
50 TABLET, FILM COATED ORAL ONCE
Status: COMPLETED | OUTPATIENT
Start: 2022-10-22 | End: 2022-10-22

## 2022-10-22 RX ADMIN — HYDROXYZINE HYDROCHLORIDE 50 MG: 25 TABLET, FILM COATED ORAL at 23:48

## 2022-10-22 RX ADMIN — SODIUM CHLORIDE 1000 ML: 9 INJECTION, SOLUTION INTRAVENOUS at 23:55

## 2022-10-22 RX ADMIN — KETOROLAC TROMETHAMINE 30 MG: 30 INJECTION, SOLUTION INTRAMUSCULAR at 23:55

## 2022-10-22 ASSESSMENT — ENCOUNTER SYMPTOMS
HEMATURIA: 1
ROS GI COMMENTS: NEGATIVE FOR HEMATEMESIS
DIARRHEA: 1
VOMITING: 1
FEVER: 1
HEADACHES: 1
COUGH: 1
BLOOD IN STOOL: 1

## 2022-10-22 ASSESSMENT — ACTIVITIES OF DAILY LIVING (ADL): ADLS_ACUITY_SCORE: 35

## 2022-10-23 VITALS
RESPIRATION RATE: 18 BRPM | SYSTOLIC BLOOD PRESSURE: 101 MMHG | OXYGEN SATURATION: 97 % | TEMPERATURE: 97.7 F | HEIGHT: 62 IN | BODY MASS INDEX: 21.16 KG/M2 | WEIGHT: 115 LBS | DIASTOLIC BLOOD PRESSURE: 65 MMHG | HEART RATE: 110 BPM

## 2022-10-23 LAB
ALBUMIN SERPL BCG-MCNC: 4.6 G/DL (ref 3.5–5.2)
ALP SERPL-CCNC: 96 U/L (ref 35–104)
ALT SERPL W P-5'-P-CCNC: 11 U/L (ref 10–35)
ANION GAP SERPL CALCULATED.3IONS-SCNC: 15 MMOL/L (ref 7–15)
AST SERPL W P-5'-P-CCNC: 23 U/L (ref 10–35)
BILIRUB SERPL-MCNC: 0.2 MG/DL
BUN SERPL-MCNC: 16.1 MG/DL (ref 8–23)
CALCIUM SERPL-MCNC: 9.5 MG/DL (ref 8.8–10.2)
CHLORIDE SERPL-SCNC: 103 MMOL/L (ref 98–107)
CREAT SERPL-MCNC: 0.73 MG/DL (ref 0.51–0.95)
DEPRECATED HCO3 PLAS-SCNC: 22 MMOL/L (ref 22–29)
GFR SERPL CREATININE-BSD FRML MDRD: >90 ML/MIN/1.73M2
GLUCOSE SERPL-MCNC: 103 MG/DL (ref 70–99)
POTASSIUM SERPL-SCNC: 3.8 MMOL/L (ref 3.4–5.3)
PROT SERPL-MCNC: 6.7 G/DL (ref 6.4–8.3)
SODIUM SERPL-SCNC: 140 MMOL/L (ref 136–145)
T4 FREE SERPL-MCNC: 0.87 NG/DL (ref 0.9–1.7)
TSH SERPL DL<=0.005 MIU/L-ACNC: 35.13 UIU/ML (ref 0.3–4.2)

## 2022-10-23 PROCEDURE — 80053 COMPREHEN METABOLIC PANEL: CPT | Performed by: EMERGENCY MEDICINE

## 2022-10-23 PROCEDURE — 84443 ASSAY THYROID STIM HORMONE: CPT | Performed by: EMERGENCY MEDICINE

## 2022-10-23 PROCEDURE — 84439 ASSAY OF FREE THYROXINE: CPT | Performed by: EMERGENCY MEDICINE

## 2022-10-23 PROCEDURE — 36415 COLL VENOUS BLD VENIPUNCTURE: CPT | Performed by: EMERGENCY MEDICINE

## 2022-10-23 PROCEDURE — 250N000013 HC RX MED GY IP 250 OP 250 PS 637: Performed by: EMERGENCY MEDICINE

## 2022-10-23 RX ORDER — TRAMADOL HYDROCHLORIDE 50 MG/1
100 TABLET ORAL ONCE
Status: COMPLETED | OUTPATIENT
Start: 2022-10-23 | End: 2022-10-23

## 2022-10-23 RX ORDER — TRAMADOL HYDROCHLORIDE 50 MG/1
50 TABLET ORAL EVERY 6 HOURS PRN
Qty: 6 TABLET | Refills: 0 | Status: SHIPPED | OUTPATIENT
Start: 2022-10-23 | End: 2022-10-26

## 2022-10-23 RX ADMIN — TRAMADOL HYDROCHLORIDE 100 MG: 50 TABLET ORAL at 00:39

## 2022-10-23 ASSESSMENT — ACTIVITIES OF DAILY LIVING (ADL): ADLS_ACUITY_SCORE: 35

## 2022-10-23 NOTE — ED TRIAGE NOTES
Patient transported via [facility] for evaluation of headach, neck pain, abdominal pain, low back pain, and N/V. Per EMS patient's BP varied from normal to low and then normal. EMS stated they were told by PD, patient hospital shops at the end of the month when she runs out of percocet. EMS EKG: STach: 120s, last BP: 107/63, and O2 sats within normal.      Triage Assessment     Row Name 10/22/22 7817       Triage Assessment (Adult)    Airway WDL WDL       Respiratory WDL    Respiratory WDL WDL       Skin Circulation/Temperature WDL    Skin Circulation/Temperature WDL WDL       Cardiac WDL    Cardiac WDL X  tachycardia       Peripheral/Neurovascular WDL    Peripheral Neurovascular WDL WDL       Cognitive/Neuro/Behavioral WDL    Cognitive/Neuro/Behavioral WDL WDL

## 2022-10-23 NOTE — DISCHARGE INSTRUCTIONS
Take tramadol only as needed, it is imperative that you follow-up with your pain clinic and primary care doctor.

## 2022-10-23 NOTE — ED NOTES
Attempted to assist patient with calling friends for ride home. Patient agreeable to waiting in lobby.

## 2022-10-23 NOTE — ED TRIAGE NOTES
Triage Assessment     Row Name 10/22/22 6030       Triage Assessment (Adult)    Airway WDL WDL       Respiratory WDL    Respiratory WDL WDL       Skin Circulation/Temperature WDL    Skin Circulation/Temperature WDL WDL       Cardiac WDL    Cardiac WDL X  tachycardia       Peripheral/Neurovascular WDL    Peripheral Neurovascular WDL WDL       Cognitive/Neuro/Behavioral WDL    Cognitive/Neuro/Behavioral WDL WDL

## 2022-10-23 NOTE — ED PROVIDER NOTES
EMERGENCY DEPARTMENT ENCOUNTER      NAME: Jen Pacheco  AGE: 62 year old female  YOB: 1960  MRN: [id number]  EVALUATION DATE & TIME: 10/22/2022 10:42 PM    PCP: Yon Lo    ED PROVIDER: Jocelyn Gay M.D.      Chief Complaint   Patient presents with     Headache         FINAL IMPRESSION:  1. Acute nonintractable headache, unspecified headache type        MEDICAL DECISION MAKING:    Pertinent Labs & Imaging studies reviewed. (See chart for details)  ED Course as of 10/23/22 0106   Sat Oct 22, 2022   2304 Exam for patient here she is mildly anxious appearing but otherwise nontoxic.  Tachycardia but with regular rhythm.    Check labs for patient here, plan to give her a liter of fluid, Vistaril for her stomach issues and Toradol for her headache.  Do not feel she requires any other urgent imaging.  Her abdominal exam is completely benign.  Will reevaluate afterwards.   2317 Afebrile.  Vital signs here with significant tachycardia, otherwise unremarkable.  Patient is coming into the emergency room today for evaluation of head pain, nausea and vomiting.  She can only take Vistaril for her nausea and vomiting.  Says this is been ongoing for the last several days.  Head pain she says for the last 5 days.  Just headache.  No visual changes.  Abdominal pain has been present for same amount of time.  She states she is only able to take Vistaril, when asked about Zofran, Compazine, Phenergan, droperidol she states this is only thing that she is ever taken that has worked for her.  She has a history of opioid use disorder, chronic pain.  She was seen earlier today at [facility] where she had a work-up for chest pain and head pain.  She had full work-up, EKG troponin.  Negative chest x-ray negative D-dimer, negative troponin.  So do not feel we need to repeat those at this time.  We will check LFTs and lipase I do not have a     Imaging, labs are unremarkable.  No longer feeling nauseated.   No vomiting.  Still complaining of headache after Toradol.  She states she does not want any narcotic medication.  Discussed other medications such as Tylenol which patient does not want to take as well as offered droperidol to see if this would help but she was refusing.  Stated that she does do well with tramadol.  Given dose of tramadol, did have improvement that was acceptable to patient.  Discharged however patient states that she is not able to find a ride home.  Discussed options with patient such as wheelchair transport, having her call a cab or uber but she states that she is to have a ride and she will wait in the lobby until 6 AM when she can call and have someone come and get her.  Discharge at this time.    Medical Decision Making    Supplemental history from: N/A    External Record(s) Reviewed: Inpatient Record and Outpatient Record    Differential Diagnosis: See MDM charting for differential considered.     I performed an independent interpretation of the: N/A    Discussed with radiology regarding test interpretation: N/A    Discussion of management with another provider: N/A    The following testing was considered but ultimately not selected: None     I considered prescription management with: Symptomatic Management    The patient's care impacted: None    Consideration of Admission/Observation: N/A - Patient admitted or discharged without consideration for admission    Care significantly affected by Social Determinants of Health including: N/A       Critical care: 0 minutes excluding separately billable procedures.  Includes bedside management, time reviewing test results, review of records, discussing the case with staff, documenting the medical record and time spent with family members (or surrogate decision makers) discussing specific treatment issues.          ED COURSE:  10:56 PM I met with the patient, obtained history, performed an initial exam, and discussed options and plan for diagnostics  and treatment here in the ED.  12:25 AM I rechecked and updated patient.  1:06 AM I rechecked and updated patient. We discussed the plan for discharge and the patient is agreeable. Reviewed supportive cares, symptomatic treatment, outpatient follow up, and reasons to return to the Emergency Department. Patient to be discharged by ED RN.     The importance of close follow up was discussed. We reviewed warning signs and symptoms, and I instructed Ms. Orlando Pacheco to return to the emergency department immediately if she develops any new or worsening symptoms. I provided additional verbal discharge instructions. Ms. Orlando Pacheco expressed understanding and agreement with this plan of care, her questions were answered, and she was discharged in stable condition.     MEDICATIONS GIVEN IN THE EMERGENCY:  Medications   0.9% sodium chloride BOLUS (1,000 mLs Intravenous New Bag 10/22/22 2355)   hydrOXYzine (ATARAX) tablet 50 mg (50 mg Oral Given 10/22/22 2348)   ketorolac (TORADOL) injection 30 mg (30 mg Intravenous Given 10/22/22 2355)   traMADol (ULTRAM) tablet 100 mg (100 mg Oral Given 10/23/22 0039)       NEW PRESCRIPTIONS STARTED AT TODAY'S ER VISIT:  New Prescriptions    TRAMADOL (ULTRAM) 50 MG TABLET    Take 1 tablet (50 mg) by mouth every 6 hours as needed for severe pain          =================================================================    HPI    Patient information was obtained from: Patient    Use of : N/A         Jne HUERTAS Orlando Pacheco is a 62 year old female with a history of chronic migraine without aura, degeneration of lumbosacral intervertebral disc, cervicalgia, fibromyalgia, HTN, hyperlipidemia, hypothyroidism, polysubstance abuse, narcotic dependence, opioid dependence with opioid-induced disorder, benzodiazepine abuse, who presents to the ED EMS for the evaluation of headache.    Patient reports she developed a severe headache that started 5-6 days ago. She notes some associating  "tightness in her neck, vomiting, diarrhea with a small amount of blood, hematuria, and a little dry cough. She also mentions that three days ago, she had a fever of 101.3 degrees, but states she has not had another fever since then. She has been alternating Tylenol and Ibuprofen without relief. Patient reports a history of headache as a child but none since then. Otherwise, she denies any hematemesis. Of note, patient mentions some bilateral eye swelling when she sleeps over the last 2 weeks, which has started to resolve. She has been placing warm compresses for her symptoms. Patient also mentions she has an upcoming cardiac test in about 1.5 weeks. No other complaints at this time.    Per chart review, patient was seen at [facility] Emergency Department on 10/22/22 for acute non-intractable headache. Patient presents with chest, back, neck, and head pain. Chest x-ray and D-dimer both normal. Patient discharged home.    REVIEW OF SYSTEMS   Review of Systems   Constitutional: Positive for fever (resolved).   Respiratory: Positive for cough.    Gastrointestinal: Positive for blood in stool, diarrhea and vomiting.        Negative for hematemesis   Genitourinary: Positive for hematuria.   Musculoskeletal:        Positive for neck \"tightness\"   Neurological: Positive for headaches.   All other systems reviewed and are negative.    PAST MEDICAL HISTORY:  History reviewed. No pertinent past medical history.    PAST SURGICAL HISTORY:  Past Surgical History:   Procedure Laterality Date     [facility] REDUCTION OF LARGE BREAST      Description: Breast Surgery Reduction Procedure;  Recorded: 02/26/2014;     [facility] REMOVAL OF OVARY(S)      Description: Oophorectomy Unilateral Right Side;  Recorded: 02/26/2014;     [facility] TOTAL ABDOM HYSTERECTOMY      Description: Total Abdominal Hysterectomy;  Recorded: 02/26/2014;       CURRENT MEDICATIONS:    No current facility-administered medications for this encounter.    Current Outpatient " Medications:      traMADol (ULTRAM) 50 MG tablet, Take 1 tablet (50 mg) by mouth every 6 hours as needed for severe pain, Disp: 6 tablet, Rfl: 0     albuterol (PROAIR HFA/PROVENTIL HFA/VENTOLIN HFA) 108 (90 BASE) MCG/ACT Inhaler, Inhale 2 puffs into the lungs every 4 hours as needed, Disp: , Rfl:      buprenorphine HCl-naloxone HCl (SUBOXONE) 8-2 MG per film, Place 1 Film under the tongue 2 times daily, Disp: 7 Film, Rfl: 0     cholecalciferol 50 MCG (2000 UT) tablet, Take 2,000 Units by mouth, Disp: , Rfl:      EPINEPHrine 0.3 MG/0.3ML injection, Inject 0.3 mg into the muscle as needed, Disp: , Rfl:      escitalopram (LEXAPRO) 10 MG tablet, Take 15 mg by mouth, Disp: , Rfl:      fluticasone-salmeterol (ADVAIR) 250-50 MCG/ACT inhaler, Inhale 1 puff into the lungs, Disp: , Rfl:      gabapentin (NEURONTIN) 600 MG tablet, Take 600 mg by mouth, Disp: , Rfl:      hydrOXYzine (VISTARIL) 50 MG capsule, Take 50 mg by mouth, Disp: , Rfl:      ibuprofen (ADVIL/MOTRIN) 200 MG tablet, Take 600 mg by mouth, Disp: , Rfl:      levothyroxine (SYNTHROID/LEVOTHROID) 175 MCG tablet, , Disp: , Rfl:      mirtazapine (REMERON) 7.5 MG tablet, Take 7.5 mg by mouth, Disp: , Rfl:      morphine (MS CONTIN) 15 MG CR tablet, Take 15 mg by mouth, Disp: , Rfl:      multivitamin w/minerals (THERA-VIT-M) tablet, Take 1 tablet by mouth daily, Disp: , Rfl:      oxyCODONE-acetaminophen (PERCOCET)  MG per tablet, Take 2 tablets by mouth, Disp: , Rfl:      prazosin (MINIPRESS) 2 MG capsule, Take 2 mg by mouth, Disp: , Rfl:      propranolol (INDERAL) 10 MG tablet, Take by mouth every morning, Disp: , Rfl:      tiZANidine (ZANAFLEX) 4 MG tablet, Take 4 mg by mouth, Disp: , Rfl:     ALLERGIES:  Allergies   Allergen Reactions     Penicillins Swelling and Rash     Shellfish-Derived Products Anaphylaxis     Diatrizoate Unknown     Not given because of allergy to shellfish     Xkbsz-Clnom-Fhoundd-Pramoxine      Rash     Quetiapine      Myoclonus.       "Sumatriptan Other (See Comments)     Unable to take any triptans     Cyclobenzaprine Swelling and Rash     Latex Rash     Prochlorperazine Anxiety       FAMILY HISTORY:  Family History   Problem Relation Age of Onset     Hypertension Mother      No Known Problems Father      Heart Disease Paternal Grandmother      Heart Disease Paternal Grandfather        SOCIAL HISTORY:   Social History     Socioeconomic History     Marital status: Single   Tobacco Use     Smoking status: Every Day     Packs/day: 0.25     Types: Cigarettes     Smokeless tobacco: Never   Substance and Sexual Activity     Alcohol use: Not Currently     Alcohol/week: 21.0 standard drinks     Types: 21 Shots of liquor per week     Drug use: Yes     Types: Morphine, Opiates     Sexual activity: Not Currently       PHYSICAL EXAM:    Vitals: /70   Pulse 110   Temp 97.7  F (36.5  C) (Oral)   Resp 18   Ht 1.575 m (5' 2\")   Wt 52.2 kg (115 lb)   SpO2 97%   BMI 21.03 kg/m     General:. Alert and interactive, mildly anxious appearing but otherwise nontoxic.  HENT: Oropharynx without erythema or exudates. MMM.  TMs clear bilaterally.  Eyes: Pupils mid-sized and equally reactive.   Neck: Full AROM.  No midline tenderness to palpation.  Cardiovascular: Tachycardia, Regular rhythm. Peripheral pulses 2+ bilaterally.  Chest/Pulmonary: Normal work of breathing. Lung sounds clear and equal throughout, no wheezes or crackles. No chest wall tenderness or deformities.  Abdomen: Soft, nondistended. Nontender without guarding or rebound.  Back/Spine: No CVA or midline tenderness.  Extremities: Normal ROM of all major joints. No lower extremity edema.   Skin: Warm and dry. Normal skin color.   Neuro: Speech clear. CNs grossly intact. Moves all extremities appropriately. Strength and sensation grossly intact to all extremities.   Psych: Normal affect/mood, cooperative, memory appropriate.     LAB:  All pertinent labs reviewed and interpreted.  Labs Ordered and " Resulted from Time of ED Arrival to Time of ED Departure   COMPREHENSIVE METABOLIC PANEL - Abnormal       Result Value    Sodium 140      Potassium 3.8      Chloride 103      Carbon Dioxide (CO2) 22      Anion Gap 15      Urea Nitrogen 16.1      Creatinine 0.73      Calcium 9.5      Glucose 103 (*)     Alkaline Phosphatase 96      AST 23      ALT 11      Protein Total 6.7      Albumin 4.6      Bilirubin Total 0.2      GFR Estimate >90     LIPASE - Abnormal    Lipase 63 (*)    CBC WITH PLATELETS AND DIFFERENTIAL    WBC Count 7.7      RBC Count 4.13      Hemoglobin 12.5      Hematocrit 37.3      MCV 90      MCH 30.3      MCHC 33.5      RDW 13.2      Platelet Count 311      % Neutrophils 62      % Lymphocytes 28      % Monocytes 8      % Eosinophils 1      % Basophils 1      % Immature Granulocytes 0      NRBCs per 100 WBC 0      Absolute Neutrophils 4.9      Absolute Lymphocytes 2.2      Absolute Monocytes 0.6      Absolute Eosinophils 0.1      Absolute Basophils 0.0      Absolute Immature Granulocytes 0.0      Absolute NRBCs 0.0     TSH WITH FREE T4 REFLEX       RADIOLOGY:  Head CT w/o contrast   Final Result   IMPRESSION:   1.  No CT evidence for acute intracranial process.   2.  Mild chronic microvascular ischemic changes.              I, Rosa Nguyen, am serving as a scribe to document services personally performed by Dr. Jocelyn Gay  based on my observation and the provider's statements to me. I, Jocelyn Gay MD attest that Rosa Nguyen is acting in a scribe capacity, has observed my performance of the services and has documented them in accordance with my direction.      Jocelyn Gay M.D.  Emergency Medicine  [facility] EMERGENCY DEPARTMENT  [facility] [facility] [id number] [phone]  Dept: [phone]     Jocelyn Gay MD  10/23/22 0203

## 2024-02-25 ENCOUNTER — HOSPITAL ENCOUNTER (EMERGENCY)
Facility: CLINIC | Age: 64
Discharge: HOME | End: 2024-02-25
Attending: FAMILY MEDICINE | Admitting: FAMILY MEDICINE
Payer: COMMERCIAL

## 2024-02-25 VITALS
SYSTOLIC BLOOD PRESSURE: 208 MMHG | BODY MASS INDEX: 22.08 KG/M2 | WEIGHT: 120 LBS | RESPIRATION RATE: 18 BRPM | TEMPERATURE: 98 F | OXYGEN SATURATION: 98 % | HEIGHT: 62 IN | HEART RATE: 117 BPM | DIASTOLIC BLOOD PRESSURE: 97 MMHG

## 2024-02-25 DIAGNOSIS — F41.9 ACUTE ANXIETY: ICD-10-CM

## 2024-02-25 DIAGNOSIS — F43.0 ACUTE REACTION TO SITUATIONAL STRESS: ICD-10-CM

## 2024-02-25 PROCEDURE — 99283 EMERGENCY DEPT VISIT LOW MDM: CPT

## 2024-02-25 RX ORDER — LORAZEPAM 0.5 MG/1
0.5 TABLET ORAL EVERY 6 HOURS PRN
Qty: 10 TABLET | Refills: 0 | Status: SHIPPED | OUTPATIENT
Start: 2024-02-25

## 2024-02-25 ASSESSMENT — ACTIVITIES OF DAILY LIVING (ADL): ADLS_ACUITY_SCORE: 35

## 2024-02-25 ASSESSMENT — COLUMBIA-SUICIDE SEVERITY RATING SCALE - C-SSRS
6. HAVE YOU EVER DONE ANYTHING, STARTED TO DO ANYTHING, OR PREPARED TO DO ANYTHING TO END YOUR LIFE?: NO
1. IN THE PAST MONTH, HAVE YOU WISHED YOU WERE DEAD OR WISHED YOU COULD GO TO SLEEP AND NOT WAKE UP?: NO
2. HAVE YOU ACTUALLY HAD ANY THOUGHTS OF KILLING YOURSELF IN THE PAST MONTH?: NO

## 2024-02-25 NOTE — ED PROVIDER NOTES
EMERGENCY DEPARTMENT ENCOUNTER      NAME: Jen Pacheco  AGE: 63 year old female  YOB: 1960  MRN: [id number]  EVALUATION DATE & TIME: 2/25/2024  4:33 PM    PCP: Yon Lo    ED PROVIDER: Philipp Honeycutt M.D.    Chief Complaint   Patient presents with    Anxiety    Insomnia       FINAL IMPRESSION:  No diagnosis found.    ED COURSE & MEDICAL DECISION MAKING:    Pertinent Labs & Imaging studies independently interpreted by me. (See chart for details)  4:52 PM Patient seen and examined, reviewed prior admission January 2024 which was for community-acquired pneumonia, also noted that time to have dysautonomia.  Patient presents today with increased anxiety and stress over the last couple days.  Reports history of underlying depression and anxiety but does not take medication for these, however increased stressors recently with her best friend dying as well as mother in the hospital.  On exam here, patient is awake alert, mildly tachycardic and hypertensive but cooperative.  She denies chest pain or shortness of breath.  Denies suicidal homicidal ideation.  Patient is stable to discharge with Ativan and follow-up with primary care next week as scheduled    At the conclusion of the encounter I discussed the results of all of the tests and the disposition. The questions were answered. The patient or family acknowledged understanding and was agreeable with the care plan.     Medical Decision Making  Obtained supplemental history:Supplemental history obtained?: No  Reviewed external records: External records reviewed?: No  Care impacted by chronic illness:Chronic Pain, Hyperlipidemia, Hypertension, and Mental Health  Care significantly affected by social determinants of health:N/A  Did you consider but not order tests?: Work up considered but not performed and documented in chart, if applicable  Did you interpret images independently?: Independent interpretation of ECG and images noted in  documentation, when applicable.  Consultation discussion with other provider:Did you involve another provider (consultant, MH, pharmacy, etc.)?: No  Discharge. I prescribed additional prescription strength medication(s) as charted. N/A.    MEDICATIONS GIVEN IN THE EMERGENCY:  Medications - No data to display    NEW PRESCRIPTIONS STARTED AT TODAY'S ER VISIT  New Prescriptions    No medications on file       =================================================================    HPI    Patient information was obtained from: patient       Jen Pacheco is a 63 year old female with a pertinent history of generalized anxiety disorder, depressive disorder, substance abuse, hypertension, hyperlipidemia, who presents to this ED via walk-in for evaluation of insomnia and anxiety.  Patient reports history of anxiety and depression but no prior hospitalization and not currently being treated, does not have a counselor or therapist.  She reports a couple days ago her best friend  and she found her, also reports her mother and some hospital increase stresses.  Denies alcohol use, admits to smoking cigarettes.  Denies suicide or homicide ideation.  Concerned mostly because she cannot sleep and feels stressed and anxious    REVIEW OF SYSTEMS   Review of Systems   All other systems reviewed and negative    PAST MEDICAL HISTORY:  No past medical history on file.    PAST SURGICAL HISTORY:  Past Surgical History:   Procedure Laterality Date    [facility] REDUCTION OF LARGE BREAST      Description: Breast Surgery Reduction Procedure;  Recorded: 2014;    [facility] REMOVAL OF OVARY(S)      Description: Oophorectomy Unilateral Right Side;  Recorded: 2014;    [facility] TOTAL ABDOM HYSTERECTOMY      Description: Total Abdominal Hysterectomy;  Recorded: 2014;       CURRENT MEDICATIONS:    No current facility-administered medications for this encounter.     Current Outpatient Medications   Medication    albuterol (PROAIR HFA/PROVENTIL  "HFA/VENTOLIN HFA) 108 (90 BASE) MCG/ACT Inhaler    buprenorphine HCl-naloxone HCl (SUBOXONE) 8-2 MG per film    cholecalciferol 50 MCG (2000 UT) tablet    EPINEPHrine 0.3 MG/0.3ML injection    escitalopram (LEXAPRO) 10 MG tablet    fluticasone-salmeterol (ADVAIR) 250-50 MCG/ACT inhaler    gabapentin (NEURONTIN) 600 MG tablet    hydrOXYzine (VISTARIL) 50 MG capsule    ibuprofen (ADVIL/MOTRIN) 200 MG tablet    levothyroxine (SYNTHROID/LEVOTHROID) 175 MCG tablet    mirtazapine (REMERON) 7.5 MG tablet    morphine (MS CONTIN) 15 MG CR tablet    multivitamin w/minerals (THERA-VIT-M) tablet    oxyCODONE-acetaminophen (PERCOCET)  MG per tablet    prazosin (MINIPRESS) 2 MG capsule    propranolol (INDERAL) 10 MG tablet    tiZANidine (ZANAFLEX) 4 MG tablet       ALLERGIES:  Allergies   Allergen Reactions    Penicillins Swelling and Rash    Shellfish-Derived Products Anaphylaxis    Diatrizoate Unknown     Not given because of allergy to shellfish    Mxpsk-Qmopj-Dzibzsz-Pramoxine      Rash    Quetiapine      Myoclonus.     Sumatriptan Other (See Comments)     Unable to take any triptans    Cyclobenzaprine Swelling and Rash    Latex Rash    Prochlorperazine Anxiety       FAMILY HISTORY:  Family History   Problem Relation Age of Onset    Hypertension Mother     No Known Problems Father     Heart Disease Paternal Grandmother     Heart Disease Paternal Grandfather        SOCIAL HISTORY:   Social History     Socioeconomic History    Marital status: Single   Tobacco Use    Smoking status: Every Day     Packs/day: .25     Types: Cigarettes    Smokeless tobacco: Never   Substance and Sexual Activity    Alcohol use: Not Currently     Alcohol/week: 21.0 standard drinks of alcohol     Types: 21 Shots of liquor per week    Drug use: Yes     Types: Morphine, Opiates    Sexual activity: Not Currently       VITALS:  BP (!) 208/97   Pulse 117   Temp 98  F (36.7  C) (Oral)   Resp 18   Ht 1.575 m (5' 2\")   Wt 54.4 kg (120 lb)   SpO2 " 98%   BMI 21.95 kg/m      PHYSICAL EXAM:  Physical Exam  Vitals and nursing note reviewed.   Constitutional:       Appearance: Normal appearance.   HENT:      Head: Normocephalic and atraumatic.      Right Ear: External ear normal.      Left Ear: External ear normal.      Nose: Nose normal.      Mouth/Throat:      Mouth: Mucous membranes are moist.   Eyes:      Extraocular Movements: Extraocular movements intact.      Conjunctiva/sclera: Conjunctivae normal.      Pupils: Pupils are equal, round, and reactive to light.   Cardiovascular:      Rate and Rhythm: Regular rhythm. Tachycardia present.   Pulmonary:      Effort: Pulmonary effort is normal.      Breath sounds: Normal breath sounds. No wheezing or rales.   Abdominal:      General: Abdomen is flat. There is no distension.      Palpations: Abdomen is soft.      Tenderness: There is no abdominal tenderness. There is no guarding.   Musculoskeletal:         General: Normal range of motion.      Cervical back: Normal range of motion and neck supple.      Right lower leg: No edema.      Left lower leg: No edema.   Lymphadenopathy:      Cervical: No cervical adenopathy.   Skin:     General: Skin is warm and dry.   Neurological:      General: No focal deficit present.      Mental Status: She is alert and oriented to person, place, and time. Mental status is at baseline.      Comments: No gross focal neurologic deficits   Psychiatric:         Mood and Affect: Mood normal.         Behavior: Behavior normal.         Thought Content: Thought content normal.        Philipp Honeycutt M.D.  Emergency Medicine  [facility] EMERGENCY ROOM  [address] [facility] [address]  [phone]  Dept: [phone]       Philipp Honeycutt MD  02/25/24 9875

## 2024-02-25 NOTE — ED TRIAGE NOTES
Pt states she found one of her friends dead 3 days ago.  Has been unable to sleep and is having nightmares since.  Pt is also dealing with her mother in the hospital.  Feeling anxious and exhausted.     Triage Assessment (Adult)       Row Name 02/25/24 3127          Triage Assessment    Airway WDL WDL        Respiratory WDL    Respiratory WDL WDL        Skin Circulation/Temperature WDL    Skin Circulation/Temperature WDL WDL        Cardiac WDL    Cardiac WDL WDL        Peripheral/Neurovascular WDL    Peripheral Neurovascular WDL WDL        Cognitive/Neuro/Behavioral WDL    Cognitive/Neuro/Behavioral WDL WDL

## 2024-08-20 ENCOUNTER — TRANSCRIBE ORDERS (OUTPATIENT)
Dept: OTHER | Age: 64
End: 2024-08-20

## 2024-08-20 DIAGNOSIS — R91.8 LUNG NODULE, MULTIPLE: Primary | ICD-10-CM

## 2024-08-30 ENCOUNTER — HOSPITAL ENCOUNTER (EMERGENCY)
Facility: CLINIC | Age: 64
Discharge: HOME | End: 2024-08-30
Payer: COMMERCIAL

## 2024-08-30 VITALS
OXYGEN SATURATION: 96 % | BODY MASS INDEX: 22.82 KG/M2 | SYSTOLIC BLOOD PRESSURE: 160 MMHG | HEIGHT: 62 IN | TEMPERATURE: 98.1 F | DIASTOLIC BLOOD PRESSURE: 75 MMHG | RESPIRATION RATE: 16 BRPM | WEIGHT: 124 LBS | HEART RATE: 55 BPM

## 2024-08-30 DIAGNOSIS — R53.1 GENERALIZED WEAKNESS: Primary | ICD-10-CM

## 2024-08-30 DIAGNOSIS — R25.1 SHAKINESS: ICD-10-CM

## 2024-08-30 PROBLEM — G40.909 SEIZURE DISORDER (H): Status: ACTIVE | Noted: 2024-04-24

## 2024-08-30 PROBLEM — E78.5 DYSLIPIDEMIA: Status: ACTIVE | Noted: 2023-06-15

## 2024-08-30 PROBLEM — M54.6 PAIN IN THORACIC SPINE: Status: ACTIVE | Noted: 2024-08-30

## 2024-08-30 PROBLEM — F32.9 MAJOR DEPRESSION: Status: ACTIVE | Noted: 2024-08-30

## 2024-08-30 PROBLEM — J30.9 ALLERGIC RHINITIS: Status: ACTIVE | Noted: 2022-11-11

## 2024-08-30 PROBLEM — J18.9 PNEUMONIA OF RIGHT UPPER LOBE DUE TO INFECTIOUS ORGANISM: Status: ACTIVE | Noted: 2024-07-22

## 2024-08-30 PROBLEM — F11.20 OPIOID USE DISORDER, SEVERE, ON MAINTENANCE THERAPY (H): Status: ACTIVE | Noted: 2022-07-15

## 2024-08-30 PROBLEM — S82.142A CLOSED FRACTURE OF LEFT TIBIAL PLATEAU: Status: ACTIVE | Noted: 2023-05-19

## 2024-08-30 PROBLEM — F43.10 PTSD (POST-TRAUMATIC STRESS DISORDER): Status: ACTIVE | Noted: 2023-05-19

## 2024-08-30 PROBLEM — F43.21 GRIEF REACTION: Status: ACTIVE | Noted: 2024-03-05

## 2024-08-30 PROBLEM — F11.21 OPIOID USE DISORDER, MODERATE, IN EARLY REMISSION (H): Status: ACTIVE | Noted: 2024-04-17

## 2024-08-30 PROBLEM — F60.89 CLUSTER B PERSONALITY DISORDER (H): Status: ACTIVE | Noted: 2024-03-11

## 2024-08-30 PROBLEM — R91.8 MULTIPLE LUNG NODULES: Status: ACTIVE | Noted: 2024-07-22

## 2024-08-30 PROBLEM — H04.123 DRY EYES: Status: ACTIVE | Noted: 2023-08-03

## 2024-08-30 PROBLEM — R41.0 DELIRIUM: Status: ACTIVE | Noted: 2024-07-22

## 2024-08-30 PROBLEM — M26.609 TEMPOROMANDIBULAR JOINT DISORDER: Status: ACTIVE | Noted: 2024-08-30

## 2024-08-30 PROBLEM — F34.1 DYSTHYMIA: Status: ACTIVE | Noted: 2024-08-30

## 2024-08-30 PROBLEM — R79.89 ELEVATED TROPONIN: Status: ACTIVE | Noted: 2024-01-29

## 2024-08-30 PROBLEM — G47.52 SLEEP BEHAVIOR DISORDER, REM: Status: ACTIVE | Noted: 2023-06-30

## 2024-08-30 PROBLEM — F43.21 ADJUSTMENT DISORDER WITH DEPRESSED MOOD: Status: ACTIVE | Noted: 2023-01-13

## 2024-08-30 PROBLEM — M54.50 LOWER BACK PAIN: Status: ACTIVE | Noted: 2024-08-30

## 2024-08-30 PROBLEM — E78.5 HYPERLIPIDEMIA: Status: ACTIVE | Noted: 2022-11-11

## 2024-08-30 PROBLEM — F33.1 MDD (MAJOR DEPRESSIVE DISORDER), RECURRENT EPISODE, MODERATE (H): Status: ACTIVE | Noted: 2024-08-30

## 2024-08-30 PROBLEM — M62.81 GENERALIZED MUSCLE WEAKNESS: Status: ACTIVE | Noted: 2023-07-30

## 2024-08-30 PROBLEM — M47.812 CERVICAL SPONDYLOSIS: Status: ACTIVE | Noted: 2022-11-11

## 2024-08-30 PROBLEM — J45.20 MILD INTERMITTENT ASTHMA: Status: ACTIVE | Noted: 2017-03-28

## 2024-08-30 PROBLEM — F51.04 PSYCHOPHYSIOLOGICAL INSOMNIA: Status: ACTIVE | Noted: 2021-12-29

## 2024-08-30 PROBLEM — R55 SYNCOPE AND COLLAPSE: Status: ACTIVE | Noted: 2023-06-26

## 2024-08-30 PROBLEM — I67.9 CEREBRAL VASCULAR DISORDER: Status: ACTIVE | Noted: 2024-07-02

## 2024-08-30 PROBLEM — G43.109 MIGRAINE WITH AURA: Status: ACTIVE | Noted: 2024-08-30

## 2024-08-30 PROBLEM — N64.4 BREAST PAIN: Status: ACTIVE | Noted: 2024-08-30

## 2024-08-30 PROBLEM — I95.1 ORTHOSTATIC HYPOTENSION: Status: ACTIVE | Noted: 2023-08-03

## 2024-08-30 PROBLEM — F10.20 ALCOHOL USE DISORDER, SEVERE, DEPENDENCE (H): Status: ACTIVE | Noted: 2024-04-22

## 2024-08-30 PROBLEM — E55.9 VITAMIN D DEFICIENCY DISEASE: Status: ACTIVE | Noted: 2017-09-14

## 2024-08-30 PROBLEM — G90.1 DYSAUTONOMIA (H): Status: ACTIVE | Noted: 2023-08-03

## 2024-08-30 PROBLEM — F11.10 OPIOID ABUSE (H): Status: ACTIVE | Noted: 2024-08-30

## 2024-08-30 PROBLEM — R09.89 LABILE BLOOD PRESSURE: Status: ACTIVE | Noted: 2023-08-01

## 2024-08-30 LAB
ALBUMIN UR-MCNC: NEGATIVE MG/DL
ANION GAP SERPL CALCULATED.3IONS-SCNC: 9 MMOL/L (ref 7–15)
APPEARANCE UR: CLEAR
BILIRUB UR QL STRIP: NEGATIVE
BUN SERPL-MCNC: 11.9 MG/DL (ref 8–23)
CALCIUM SERPL-MCNC: 9.4 MG/DL (ref 8.8–10.4)
CHLORIDE SERPL-SCNC: 105 MMOL/L (ref 98–107)
COLOR UR AUTO: YELLOW
CREAT SERPL-MCNC: 0.54 MG/DL (ref 0.51–0.95)
EGFRCR SERPLBLD CKD-EPI 2021: >90 ML/MIN/1.73M2
ERYTHROCYTE [DISTWIDTH] IN BLOOD BY AUTOMATED COUNT: 14 % (ref 10–15)
GLUCOSE SERPL-MCNC: 101 MG/DL (ref 70–99)
GLUCOSE UR STRIP-MCNC: NEGATIVE MG/DL
HCO3 SERPL-SCNC: 29 MMOL/L (ref 22–29)
HCT VFR BLD AUTO: 39.2 % (ref 35–47)
HGB BLD-MCNC: 12.8 G/DL (ref 11.7–15.7)
HGB UR QL STRIP: ABNORMAL
KETONES UR STRIP-MCNC: 5 MG/DL
LEUKOCYTE ESTERASE UR QL STRIP: ABNORMAL
MCH RBC QN AUTO: 29.6 PG (ref 26.5–33)
MCHC RBC AUTO-ENTMCNC: 32.7 G/DL (ref 31.5–36.5)
MCV RBC AUTO: 91 FL (ref 78–100)
MUCOUS THREADS #/AREA URNS LPF: PRESENT /LPF
NITRATE UR QL: NEGATIVE
PH UR STRIP: 6 [PH] (ref 5–7)
PLATELET # BLD AUTO: 183 10E3/UL (ref 150–450)
POTASSIUM SERPL-SCNC: 3.7 MMOL/L (ref 3.4–5.3)
RBC # BLD AUTO: 4.32 10E6/UL (ref 3.8–5.2)
RBC URINE: 2 /HPF
SODIUM SERPL-SCNC: 143 MMOL/L (ref 135–145)
SP GR UR STRIP: 1.02 (ref 1–1.03)
SQUAMOUS EPITHELIAL: 1 /HPF
T4 FREE SERPL-MCNC: 1.69 NG/DL (ref 0.9–1.7)
TSH SERPL DL<=0.005 MIU/L-ACNC: 0.04 UIU/ML (ref 0.3–4.2)
UROBILINOGEN UR STRIP-MCNC: NORMAL MG/DL
WBC # BLD AUTO: 5.8 10E3/UL (ref 4–11)
WBC URINE: 6 /HPF

## 2024-08-30 PROCEDURE — 99283 EMERGENCY DEPT VISIT LOW MDM: CPT

## 2024-08-30 PROCEDURE — 84439 ASSAY OF FREE THYROXINE: CPT

## 2024-08-30 PROCEDURE — 81001 URINALYSIS AUTO W/SCOPE: CPT

## 2024-08-30 PROCEDURE — 84443 ASSAY THYROID STIM HORMONE: CPT

## 2024-08-30 PROCEDURE — 93005 ELECTROCARDIOGRAM TRACING: CPT

## 2024-08-30 PROCEDURE — 82565 ASSAY OF CREATININE: CPT

## 2024-08-30 PROCEDURE — 85048 AUTOMATED LEUKOCYTE COUNT: CPT

## 2024-08-30 PROCEDURE — 36415 COLL VENOUS BLD VENIPUNCTURE: CPT

## 2024-08-30 PROCEDURE — 82374 ASSAY BLOOD CARBON DIOXIDE: CPT

## 2024-08-30 PROCEDURE — 99284 EMERGENCY DEPT VISIT MOD MDM: CPT | Mod: 25

## 2024-08-30 PROCEDURE — 93010 ELECTROCARDIOGRAM REPORT: CPT

## 2024-08-30 ASSESSMENT — ACTIVITIES OF DAILY LIVING (ADL)
ADLS_ACUITY_SCORE: 35

## 2024-08-30 NOTE — ED TRIAGE NOTES
Pt presents [facility] in [place]. Head RN sent pt to be evaluated due to BP management problems, generalized weakness, shakiness, and back pain. Pt is in rehab for opoid use but sober for 10 months. Pt has been struggling with the listed symptoms for 2 months. Nurse reports BP has been very high on some days and very low on others. Different medications have been tried. Staff wrote a note stating do not discharge until you find out what is wrong. Pt states she has a known spot on her lung and back problems that was shown on previous imaging.      Triage Assessment (Adult)       Row Name 08/30/24 0920          Triage Assessment    Airway WDL WDL        Respiratory WDL    Respiratory WDL WDL        Skin Circulation/Temperature WDL    Skin Circulation/Temperature WDL WDL        Cardiac WDL    Cardiac WDL WDL        Peripheral/Neurovascular WDL    Peripheral Neurovascular WDL WDL        Cognitive/Neuro/Behavioral WDL    Cognitive/Neuro/Behavioral WDL X     Level of Consciousness lethargic

## 2024-08-30 NOTE — DISCHARGE INSTRUCTIONS
You were seen in the emergency department today for symptoms including weakness and shakiness. We did tests including blood test, EKG, and urine test that showed no clear cause for your symptoms, but was reassuring against a life-threatening cause at this time.  At this point you are stable for further testing with your primary doctor/clinic.    Please follow up with your primary doctor as needed for ongoing evaluation and management of your symptoms.    Return to the emergency department with new or worsening symptoms that you find concerning.

## 2024-08-30 NOTE — ED PROVIDER NOTES
"[facility]  Emergency Department Visit Note    PATIENT:  Jen Pacheco     64 year old     female      [id number]    Chief complaint:  Chief Complaint   Patient presents with    Generalized Weakness    Shaking          History of present illness:  Patient is a 64 year old female with fibromyalgia, hypothyroidism, history of opioid and benzodiazepine abuse (in early remission while in treatment), chronic migraine, cluster B personality traits, dysautonomia, PTSD, diagnosis of labile blood pressure presenting for evaluation of labile blood pressure.    Coming from [facility] for evaluation of 2 to 3 months of generalized weakness, shakiness.  Note from nursing staff at facility is to not discharge patient from emergency department until underlying etiology is discovered.    Patient does report symptoms over the last 2 to 3 months including feeling \"sick\", generalized weakness, and feeling \"shaky\".  Reports on and off chest \"tightness\" from abdominal pain, dysuria.    Reports blood pressure sometimes as high as 200, then sometimes is lower.      Review of Systems:  As in HPI above    BP (!) 148/77   Pulse 67   Temp 98.1  F (36.7  C) (Oral)   Resp 16   Ht 1.575 m (5' 2\")   Wt 56.2 kg (124 lb)   SpO2 95%   BMI 22.68 kg/m        Physical Exam  Constitutional: laying in hospital bed, alert, oriented, and in no apparent distress  HEENT: normocephalic, atraumatic, pupils 3mm, equal, round, and reactive to light, and sclerae anicteric  Neck: no stridor  Cardiovascular: regular rate and rhythm and no murmurs, rubs, or extra heart sounds  Pulmonary: breathing comfortably on room air and lungs clear to auscultation bilaterally  Abdominal: soft, non-tender, non-distended  Extremities/MSK: no peripheral edema  Skin: warm, dry  Neurologic: moves all four extremities spontaneously, GCS 15, CNII-XII intact, 5/5  strength bilaterally, 5/5 strength in dorsiflexion and " plantarflexion bilaterally, sensation intact to light touch in bilateral upper and lower extremities, and no dysmetria on finger-nose-finger  Psychiatric: calm, appropriate      MDM:  Patient is a 64 year old female with above history presenting for evaluation of chest 2 to 3 months of vague and nonspecific symptoms.    Vitals are reassuring on arrival, very mildly hypertensive, nontachycardic, afebrile, satting well on room air. Exam is reassuring, she appears acutely well, alert, no focal neurologic deficit.    Unclear etiology of presentation though reassuring vitals, exam in the setting of symptoms ongoing for the past 2 to 3 months decreases likelihood for an emergency medical condition.    On external review of medical record, was seen yesterday through an [facility] emergency department for acute on chronic worsening of mid back pain.  Prior to that had MRI of the lumbar spine a week and a half ago, the impression for which I independently reviewed externally, without acute explanation for back pain.  She has similarly fairly regular lab workups throughout the month of July through the [facility] system similarly without clear explanation for her symptoms.    No new findings today that would increase pretest probability for an emergency medical condition.  Nevertheless, given continuation of symptoms, we will plan plan for CBC and BMP, as well as ECG.  Given patient report of nighttime incontinence will obtain urinalysis.    Disposition likely discharge pending above workup . Remainder of ED course below.    ED COURSE:  ED Course as of 08/30/24 1403   Fri Aug 30, 2024   1021 EKG 12 lead  ECG:  - Ventricular rate 60 bpm, regular  - SC, QRS, QT intervals normal  - Axis normal  - Comparison to prior ECGs: None available  - My independent interpretation: NSR   1156 CBC with platelets  Overall reassuring, no leukocytosis reassuring against significant systemic infectious inflammatory process.  No anemia, platelets normal.    1237 Basic metabolic panel(!)  Overall reassuring BMP, no electrolyte or laterality.  No kidney injury.   1237 TSH(!): 0.04  Concerning for mild clinical hyperthyroidism, no evidence at this point of thyrotoxicosis.  Awaiting free T4.   1250 T4 Free: 1.69  Free T4 within normal limits consistent with subclinical hypothyroidism. Stable for outpatient primary care follow-up.   1315 UA Macroscopic with reflex to Microscopic and Culture(!)  No UTI   1315 Workup at this point overall reassuring and without clear explanation for symptoms.  Vitals have remained reassuring throughout emergency department stay.  Stable for outpatient workup.   1331 I did reassess patient.  We had a very long discussion regarding the workup today.  She was quite upset with the absence of a clear answer as to why she was experiencing symptoms.  I attempted to emphasize with patient, given the understanding frustration with symptoms ongoing for several months.  She asked repeatedly what she was going to do going forward for symptoms, specifically inquiring about pain medication.  I discussed ibuprofen and acetaminophen.  Back and side pain seems to be her primary concern at this point, though this was not mentioned earlier in the visit.  This has been extensively worked up previously.  Patient did inquire specifically about opioids, again I had very long discussion with her regarding the fact that opioid analgesics for subacute to chronic pain with increased morbidity mortality and would not be an appropriate analgesic choice at this point.  She again expressed frustration at this.  I provided recommendation to meet with her primary doctor regarding potentially changing doses of Suboxone or methadone, but that would be best done in a managed outpatient setting and not from the emergency department.  Nevertheless, no acute medical problem has been identified today, stable for discharge.       Encounter Diagnoses:  Final diagnoses:    Generalized weakness   Shakiness       Final disposition: discharge    Reggie Izquierdo MD  8/30/2024  10:40 AM   Emergency Medicine  [facility]      Reggie Izquierdo  08/30/24 [id number]